# Patient Record
Sex: FEMALE | Race: WHITE | NOT HISPANIC OR LATINO | URBAN - METROPOLITAN AREA
[De-identification: names, ages, dates, MRNs, and addresses within clinical notes are randomized per-mention and may not be internally consistent; named-entity substitution may affect disease eponyms.]

---

## 2021-12-31 ENCOUNTER — HISTORICAL (OUTPATIENT)
Dept: ADMINISTRATIVE | Facility: HOSPITAL | Age: 41
End: 2021-12-31

## 2022-01-14 ENCOUNTER — HISTORICAL (OUTPATIENT)
Dept: ADMINISTRATIVE | Facility: HOSPITAL | Age: 42
End: 2022-01-14

## 2022-01-18 ENCOUNTER — HISTORICAL (OUTPATIENT)
Dept: SURGERY | Facility: HOSPITAL | Age: 42
End: 2022-01-18

## 2022-01-18 LAB — SARS-COV-2 AG RESP QL IA.RAPID: NEGATIVE

## 2022-04-11 ENCOUNTER — HISTORICAL (OUTPATIENT)
Dept: ADMINISTRATIVE | Facility: HOSPITAL | Age: 42
End: 2022-04-11
Payer: COMMERCIAL

## 2022-04-27 VITALS
HEIGHT: 65 IN | SYSTOLIC BLOOD PRESSURE: 116 MMHG | WEIGHT: 130 LBS | DIASTOLIC BLOOD PRESSURE: 79 MMHG | OXYGEN SATURATION: 100 % | BODY MASS INDEX: 21.66 KG/M2

## 2022-05-05 NOTE — HISTORICAL [facility] CERNER
This is a historical note converted from [employer]. Formatting and pictures may have been removed.  Please reference [facility] for original formatting and attached multimedia. Chief Complaint  fell x wednesday- lt wrist pain  History of Present Illness  41-year-old female presents office today?complaints of?left?wrist injury/left wrist pain/left forearm pain.? She expresses that she endured a fall?on Wednesday of this past week.? Fall?involved a slip?to which she?fell backwards?and braced herself?with her?left wrist/left upper extremity.? She has been attempting to?utilize rest and elevation and compression?for treatment/alleviation of symptoms?however?still having significant pain.? Pain excruciating with pronation/supination.?  strength left remedy?per report is?more weak than the right.  Review of Systems  ?14 point Review of Systems performed with no exceptions for new complaints other than as noted in HPI.  Physical Exam  Vitals & Measurements  T:?36.3? ?C (Oral)? HR:?73(Peripheral)? RR:?16? BP:?110/76? SpO2:?100%? WT:?60.000?kg? BMI:?22.04? LMP:?12/10/2021 00:00 CST?  Well developed, well nourished, NAD, alert and oriented x4  Vitals reviewed  Head: NCAT  Eyes: EOMI, conjunctiva WNL, pupils symmetric  Ears: TMs and EACs clear  Nose: Mucosa WNL, No discharge, No sinus tenderness  O/P: No erythema or exudate  Neck: supple, FROM, no LA, no thyromegaly, no bruits auscultated  CV: RRR no MRG, peripheral pulses intact  Pulmonary: Effort normal and breath sounds normal, no wheeze  Abdomen: soft, NTND, no HSM; ? NABS; no peritoneal signs ? ??  Musculoskeletal: Left upper extremity positive for swelling/bruising?at the?palmar aspect.? Pain and tenderness elicited with?pronation/supination/palpation?of the?left upper extremity/wrist.?  strength slightly decreased on the left versus the right.  Skin: warm, dry, intact  Neuro: no motor/sensory deficits, cranial nerves grossly intact, cerebellar function appears  intact  Lymphadenopathy: no abnormalities noted  Psychiatric: Cooperative, appropriate mood and affect, appears to have normal judgment  ?  Assessment/Plan  1.?Fall at home?W19.XXXA  ?Single incident/isolated?incident.  Ordered:  Office/Outpatient Visit Level 3 New [id number] PC, Left forearm pain  Left wrist injury  Fall at home, [facility], 12/31/21 12:39:00 CST  ?  2.?Left wrist injury?S69.92XA  ?My interpretation of the two-view x-ray negative for any obvious or gross fractures or dislocations.? For now,?we will place in?cast and?given arm sling for comfort.? We will plan to refer to?orthopedic physician/surgeon. ?Further recommendations pending results/rotation of x-ray from radiology.  Ordered:  Office/Outpatient Visit Level 3 New [id number] PC, Left forearm pain  Left wrist injury  Fall at home, [facility], 12/31/21 12:39:00 CST  ?  3.?Left forearm pain?M79.632  ?As above.  We will send in?some Percocet?1 tablet by mouth every 4 hours as needed for pain?x4 days #60 no refills.  Ordered:  acetaminophen-oxyCODONE, 1 tab(s), Oral, q4hr, PRN PRN as needed for pain, X 4 day(s), # 16 tab(s), 0 Refill(s)  Office/Outpatient Visit Level 3 New [id number] PC, Left forearm pain  Left wrist injury  Fall at home, [facility], 12/31/21 12:39:00 CST  XR Forearm Left 2 Views, Routine, 12/31/21 12:39:00 CST, None, Ambulatory, Rad Type, Left forearm pain, Not Scheduled, 12/31/21 12:39:00 CST  ?  And some significant improvement?in pain?following 60 mg intramuscular injection of Toradol.   Problem List/Past Medical History  Ongoing  ADHD - Attention deficit disorder with hyperactivity  Historical  No qualifying data  Procedure/Surgical History  hernia repair   Medications  Inpatient  No active inpatient medications  Home  amphetamine-dextroamphetamine 10 mg oral tablet, 10 mg= 1 tab(s), Oral, Daily  Percocet 7.5 mg-325 mg oral tablet, 1 tab(s), Oral, q4hr, PRN  Allergies  amoxicillin?(Rash)  penicillin?(Rash)  Social History  Abuse/Neglect  No,  12/31/2021  Alcohol  Current, 12/31/2021  Substance Use  Never, 12/31/2021  Tobacco  Never (less than 100 in lifetime), No, 12/31/2021

## 2022-05-05 NOTE — HISTORICAL [facility] CERNER
This is a historical note converted from [facility]. Formatting and pictures may have been removed.  Please reference [facility] for original formatting and attached multimedia. Chief Complaint  Left wrist fx went to [facility] had XR done  History of Present Illness  She is a pleasant 41-year-old who fell in late December 2021 onto her left wrist. ?She had pain and swelling. ?She was initially seen in the urgent care where radiographs?showed a nondisplaced?fracture. ?She was not placed into a cast or splint.? She had denies any numbness or tingling. 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 ?She is noticed particularly limitations in supination and pronation  Review of Systems  Comprehensive review of system?was performed with no exceptions other than noted in the history of present illness  Physical Exam  Vitals & Measurements  HR:?80(Peripheral)? BP:?116/79?  HT:?165.00?cm? WT:?58.960?kg? BMI:?21.66? LMP:?01/01/2022 00:00 CST?  Gen: WN, WD, NAD  Card/Res: NL breathing, +distal pulses  Abdomen: ND  Musculoskeletal: Focused exam of the wrist shows?apex volar deformity. ?She has decreased range of motion of the wrist secondary to pain.? She has a swelling and ecchymosis. ?Distally she is neurovascular intact  Assessment/Plan  1.?Distal radius fracture, left?S52.502A  Distal radius fracture with dorsal comminution and?loss of?volar tilt. ?I recommended surgical intervention: Open reduction internal fixation of left distal radius.? We discussed the details of the procedure and expected postoperative course.? We discussed the benefits of surgery which would be to stabilize the fracture. ?We discussed the risk of surgery?which is small but could be significant if she has continued pain, stiffness, or infection.? After discussion she like to proceed. ?Plans for surgery?1/18/2022  Ordered:  Office/Outpatient Visit Level 3 New [id number] PC, Distal radius fracture, left, [facility],  01/14/22 9:35:00 CST  ?  Orders:  XR Wrist Left Minimum 3 Views, Routine, 01/14/22 9:06:00 CST, None, Ambulatory, Rad Type, Left wrist pain, Not Scheduled, 01/14/22 9:06:00 CST   Problem List/Past Medical History  Ongoing  ADHD - Attention deficit disorder with hyperactivity  Historical  No qualifying data  Procedure/Surgical History  hernia repair   Medications  acetaminophen-oxycodone 325 mg-7.5 mg oral tablet, 1 tab(s), Oral, q6hr  amphetamine-dextroamphetamine 10 mg oral tablet, 10 mg= 1 tab(s), Oral, Daily  Allergies  amoxicillin?(Rash)  penicillin?(Rash)  Social History  Abuse/Neglect  No, No, Yes, 01/14/2022  Alcohol  Current, 12/31/2021  Substance Use  Never, 12/31/2021  Tobacco  Never (less than 100 in lifetime), No, 01/14/2022  Health Maintenance  Health Maintenance  ???Pending?(in the next year)  ??? ??OverDue  ??? ? ? ?Cervical Cancer Screening due??01/24/13??and every 3??year(s)  ??? ? ? ?Influenza Vaccine due??10/01/21??and every 1??day(s)  ??? ??Due?  ??? ? ? ?Alcohol Misuse Screening due??01/02/22??and every 1??year(s)  ??? ? ? ?ADL Screening due??01/14/22??and every 1??year(s)  ??? ? ? ?Depression Screening due??01/14/22??Unknown Frequency  ??? ? ? ?Lipid Screening due??01/14/22??Unknown Frequency  ??? ? ? ?Tetanus Vaccine due??01/14/22??and every 10??year(s)  ??? ??Due In Future?  ??? ? ? ?Obesity Screening not due until??01/01/23??and every 1??year(s)  ???Satisfied?(in the past 1 year)  ??? ??Satisfied?  ??? ? ? ?Blood Pressure Screening on??01/14/22.??Satisfied by Gillian Kramer  ??? ? ? ?Body Mass Index Check on??01/14/22.??Satisfied by Gillian Kramer  ??? ? ? ?Influenza Vaccine on??01/14/22.??Satisfied by Gillian Kramer  ??? ? ? ?Obesity Screening on??01/14/22.??Satisfied by Gillian Kramer  ?